# Patient Record
Sex: FEMALE | Race: BLACK OR AFRICAN AMERICAN | Employment: PART TIME | URBAN - METROPOLITAN AREA
[De-identification: names, ages, dates, MRNs, and addresses within clinical notes are randomized per-mention and may not be internally consistent; named-entity substitution may affect disease eponyms.]

---

## 2017-09-25 ENCOUNTER — HOSPITAL ENCOUNTER (EMERGENCY)
Age: 22
Discharge: HOME | End: 2017-09-25
Attending: EMERGENCY MEDICINE
Payer: COMMERCIAL

## 2017-09-25 ENCOUNTER — APPOINTMENT (OUTPATIENT)
Dept: ULTRASOUND IMAGING | Age: 22
End: 2017-09-25
Attending: PHYSICIAN ASSISTANT
Payer: COMMERCIAL

## 2017-09-25 VITALS
HEART RATE: 102 BPM | DIASTOLIC BLOOD PRESSURE: 71 MMHG | RESPIRATION RATE: 18 BRPM | SYSTOLIC BLOOD PRESSURE: 117 MMHG | TEMPERATURE: 98.7 F | OXYGEN SATURATION: 97 % | WEIGHT: 186.73 LBS

## 2017-09-25 DIAGNOSIS — R59.1 LYMPHADENOPATHY: Primary | ICD-10-CM

## 2017-09-25 LAB
GLUCOSE BLD STRIP.AUTO-MCNC: 129 MG/DL (ref 65–100)
SERVICE CMNT-IMP: ABNORMAL

## 2017-09-25 PROCEDURE — 99283 EMERGENCY DEPT VISIT LOW MDM: CPT

## 2017-09-25 PROCEDURE — 76536 US EXAM OF HEAD AND NECK: CPT

## 2017-09-25 PROCEDURE — 82962 GLUCOSE BLOOD TEST: CPT

## 2017-09-25 RX ORDER — SULFAMETHOXAZOLE AND TRIMETHOPRIM 800; 160 MG/1; MG/1
1 TABLET ORAL 2 TIMES DAILY
COMMUNITY
End: 2017-11-20 | Stop reason: CLARIF

## 2017-09-25 NOTE — LETTER
[address] 
[address] [facility] DEPT 
[address] [place] [address] [address] [address] 
[phone] Work/School Note Date: 9/25/2017 To Whom It May concern: 
 
Nisa Cruz was in ED [facility] 9/25/2017. She can return to work on Tuesday September 26, 2017. Sincerely, Rosa Maria PINEDA [place], [place]

## 2017-09-26 NOTE — ED NOTES
Discharge instructions given to patient and mom by Rosa Maria Marti, [address].  EDUCATED patient to follow up with the PCP if the knots get worse.

## 2017-09-26 NOTE — DISCHARGE INSTRUCTIONS
We hope that we have addressed all of your medical concerns. The examination and treatment you received in the Emergency Department were for an emergent problem and were not intended as complete care. It is important that you follow up with your healthcare provider(s) for ongoing care. If your symptoms worsen or do not improve as expected, and you are unable to reach your usual health care provider(s), you should return to the Emergency Department. Today's healthcare is undergoing tremendous change, and patient satisfaction surveys are one of the many tools to assess the quality of medical care. You may receive a survey from the [employer] regarding your experience in the Emergency Department. I hope that your experience has been completely positive, particularly the medical care that I provided. As such, please participate in the survey; anything less than excellent does not meet my expectations or intentions. Thank you for allowing us to provide you with medical care today. We realize that you have many choices for your emergency care needs. Please choose us in the future for any continued health care needs. Regards,           [place]. [place] [address].   Office: [phone]            Recent Results (from the past 24 hour(s))   GLUCOSE, POC    Collection Time: 09/25/17  9:46 PM   Result Value Ref Range    Glucose (POC) 129 (H) 65 - 100 mg/dL    Performed by Fred Burgos         Head Neck Soft Tissue    Result Date: 9/25/2017  INDICATION: Palpable knot on back of left neck EXAM: Ultrasound Neck Soft Tissue. FINDINGS: Soft tissue sonography of the posterior left neck area of clinical interest shows no cyst, abscess, mass or adenopathy. Incidentally noted is a benign appearing left submandibular lymph node measuring 1.2 x 1.0 x 0.7 cm without hypervascularity or suppuration. IMPRESSION: No fluid collection.          Swollen Lymph Nodes: Care Instructions  Your Care Instructions  Lymph nodes are small, bean-shaped glands throughout the body. They help your body fight germs and infections. Lymph nodes often swell when there is a problem such as an injury, infection, or tumor. · The nodes in your neck, under your chin, or behind your ears may swell when you have a cold or sore throat. · An injury or infection in a leg or foot can make the nodes in your groin swell. · Sometimes medicine can make lymph nodes swell, but this is rare. Treatment depends on what caused your nodes to swell. Usually the nodes return to normal size without a problem. Follow-up care is a key part of your treatment and safety. Be sure to make and go to all appointments, and call your doctor if you are having problems. Its also a good idea to know your test results and keep a list of the medicines you take. How can you care for yourself at home? · Take your medicines exactly as prescribed. Call your doctor if you think you are having a problem with your medicine. · Avoid irritation. ¨ Do not squeeze or pick at the lump. ¨ Do not stick a needle in it. · Prevent infection. Do not squeeze, drain, or puncture a painful lump. Doing this can irritate or inflame the lump, push any existing infection deeper into the skin, or cause severe bleeding. · Get extra rest. Slow down just a little from your usual routine. · Drink plenty of fluids, enough so that your urine is light yellow or clear like water. If you have kidney, heart, or liver disease and have to limit fluids, talk with your doctor before you increase the amount of fluids you drink. · Take an over-the-counter pain medicine, such as acetaminophen (Tylenol), ibuprofen (Advil, Motrin), or naproxen (Aleve). Read and follow all instructions on the label. · Do not take two or more pain medicines at the same time unless the doctor told you to. Many pain medicines have acetaminophen, which is Tylenol.  Too much acetaminophen (Tylenol) can be harmful. When should you call for help? Watch closely for changes in your health, and be sure to contact your doctor if:  · Your lymph nodes do not get smaller, or they get bigger. · The area becomes red and feels tender. · The nodes feel hard and do not move when you push on them. · You have a fever of 100° F.  · You have night sweats. · You lose weight without trying. Where can you learn more? Go to http://kai-benjy.info/. Enter J910 in the search box to learn more about \"Swollen Lymph Nodes: Care Instructions. \"  Current as of: March 3, 2017  Content Version: 11.3  © 6775-4155 [employer]. Care instructions adapted under license by [employer] (which disclaims liability or warranty for this information). If you have questions about a medical condition or this instruction, always ask your healthcare professional. [address] any warranty or liability for your use of this information.

## 2017-09-26 NOTE — ED PROVIDER NOTES
HPI Comments: This is a 22 yo AAF with medical hx remarkable for insulin dependent DM presenting ambulatory to the ED with complaint of a \"knot\" on the back of the neck noticed about one week ago. Area is not painful, erythematous or warm to touch. Yesterday noticed a palpable auricular lymph node. Seen at [facility] last week and prescribed Bactrim. The lesion has not changed since starting abx. Blood sugar has been well controlled. Denies recent travel or ill contacts. No fever, headache, sore throat, cough, rhinorrhea, sneezing, SOB, abdominal pain, nausea, vomiting, or urinary complaints. Patient is a 23 y.o. female presenting with other event. The history is provided by the patient. Other   Pertinent negatives include no chest pain, no abdominal pain, no headaches and no shortness of breath. Past Medical History:   Diagnosis Date    Diabetes ([address] type 1       History reviewed. No pertinent surgical history. History reviewed. No pertinent family history. Social History     Social History    Marital status: SINGLE     Spouse name: N/A    Number of children: N/A    Years of education: N/A     Occupational History    Not on file. Social History Main Topics    Smoking status: Not on file    Smokeless tobacco: Not on file    Alcohol use No    Drug use: Not on file    Sexual activity: Not on file     Other Topics Concern    Not on file     Social History Narrative         ALLERGIES: Review of patient's allergies indicates no known allergies. Review of Systems   Constitutional: Negative. Negative for chills, fatigue and fever. HENT: Negative. Negative for congestion, ear pain, facial swelling, rhinorrhea, sneezing and sore throat. Eyes: Negative for pain, discharge and itching. Respiratory: Negative for cough, chest tightness and shortness of breath. Cardiovascular: Negative. Negative for chest pain and leg swelling. Gastrointestinal: Negative.   Negative for abdominal distention, abdominal pain, constipation, diarrhea, nausea and vomiting. Genitourinary: Negative for difficulty urinating, frequency and urgency. Musculoskeletal: Negative for arthralgias, back pain, joint swelling, neck pain (\"knot\" on back of neck) and neck stiffness. Skin: Negative for color change and rash. Neurological: Negative for dizziness, numbness and headaches. Psychiatric/Behavioral: Negative for confusion and decreased concentration. All other systems reviewed and are negative. Vitals:    09/25/17 2041   BP: 117/71   Pulse: (!) 102   Resp: 18   Temp: 98.7 °F (37.1 °C)   SpO2: 97%   Weight: 84.7 kg (186 lb 11.7 oz)            Physical Exam   Constitutional: She is oriented to person, place, and time. She appears well-developed and well-nourished. No distress. Mildly obese AAF seated in NAD   HENT:   Head: Normocephalic and atraumatic. Right Ear: External ear normal.   Left Ear: External ear normal.   Nose: Nose normal.   Mouth/Throat: Oropharynx is clear and moist. No oropharyngeal exudate. Eyes: Conjunctivae and EOM are normal. Pupils are equal, round, and reactive to light. Right eye exhibits no discharge. Left eye exhibits no discharge. No scleral icterus. Neck: Normal range of motion. Neck supple. Cardiovascular: Normal rate and regular rhythm. Exam reveals no gallop and no friction rub. No murmur heard. Pulmonary/Chest: Effort normal and breath sounds normal. She has no wheezes. She has no rales. Abdominal: Soft. Bowel sounds are normal. She exhibits no distension. There is no tenderness. There is no rebound and no guarding. Lymphadenopathy:        Head (right side): No submental, no preauricular, no posterior auricular and no occipital adenopathy present. Head (left side): Submandibular adenopathy present. No tonsillar, no preauricular, no posterior auricular and no occipital adenopathy present.    Neurological: She is alert and oriented to person, place, and time. No cranial nerve deficit. Coordination normal.   Skin: Skin is warm and dry. She is not diaphoretic. Psychiatric: She has a normal mood and affect. Her behavior is normal.   Nursing note and vitals reviewed. MDM  Number of Diagnoses or Management Options  Diagnosis management comments: 22 yo AAF with cystic lesion to the posterior neck with associated mildly inflamed lymph node. No associated systemic symptoms. Reported good BS control. Afebrile with stable vitals. Plan  US soft tissue  Reassess. Rosa Maria PINEDA Tereso, [address]         Amount and/or Complexity of Data Reviewed  Clinical lab tests: ordered and reviewed  Tests in the radiology section of CPT®: reviewed and ordered  Independent visualization of images, tracings, or specimens: yes      ED Course       Procedures    Progress note    Imaging reviewed. + small lymph node. Will refer to ENT with continued abx. April EDWIN Rider, [address]  Patient's results have been reviewed with them. Patient and/or family have verbally conveyed their understanding and agreement of the patient's signs, symptoms, diagnosis, treatment and prognosis and additionally agree to follow up as recommended or return to the Emergency Room should their condition change prior to follow-up. Discharge instructions have also been provided to the patient with some educational information regarding their diagnosis as well a list of reasons why they would want to return to the ER prior to their follow-up appointment should their condition change. April EDWIN Rider, [address]       Patient's results have been reviewed with them. Patient and/or family have verbally conveyed their understanding and agreement of the patient's signs, symptoms, diagnosis, treatment and prognosis and additionally agree to follow up as recommended or return to the Emergency Room should their condition change prior to follow-up.   Discharge instructions have also been provided to the patient with some educational information regarding their diagnosis as well a list of reasons why they would want to return to the ER prior to their follow-up appointment should their condition change. Elvira Elizondo    A/P  Lymphadenopathy: Follow-up with ear nose and throat doctor. Continue antibiotic. Return for any new or worsening.  Rosa Maria [facility]

## 2017-09-26 NOTE — ED NOTES
Bedside shift change report given to So RN by Karen Pino RN. Report included the following information SBAR.

## 2017-09-26 NOTE — ED TRIAGE NOTES
Pt with \"knot\" on left side of neck that she first noticed yesterday. Pt reports that she was started on antibiotics for a \"knot\" on the back of her neck about a week ago. No fever, no pain.

## 2017-11-20 ENCOUNTER — HOSPITAL ENCOUNTER (EMERGENCY)
Age: 22
Discharge: HOME | End: 2017-11-20
Attending: PEDIATRICS
Payer: COMMERCIAL

## 2017-11-20 VITALS
TEMPERATURE: 98.1 F | OXYGEN SATURATION: 99 % | HEART RATE: 80 BPM | RESPIRATION RATE: 20 BRPM | DIASTOLIC BLOOD PRESSURE: 80 MMHG | WEIGHT: 186.29 LBS | SYSTOLIC BLOOD PRESSURE: 118 MMHG

## 2017-11-20 DIAGNOSIS — Z86.39 HISTORY OF DIABETES MELLITUS: ICD-10-CM

## 2017-11-20 DIAGNOSIS — R11.10 VOMITING, INTRACTABILITY OF VOMITING NOT SPECIFIED, PRESENCE OF NAUSEA NOT SPECIFIED, UNSPECIFIED VOMITING TYPE: Primary | ICD-10-CM

## 2017-11-20 DIAGNOSIS — R10.84 ABDOMINAL PAIN, GENERALIZED: ICD-10-CM

## 2017-11-20 LAB
ALBUMIN SERPL-MCNC: 3.4 G/DL (ref 3.5–5)
ALBUMIN/GLOB SERPL: 0.9 {RATIO} (ref 1.1–2.2)
ALP SERPL-CCNC: 120 U/L (ref 45–117)
ALT SERPL-CCNC: 37 U/L (ref 12–78)
ANION GAP SERPL CALC-SCNC: 4 MMOL/L (ref 5–15)
APPEARANCE UR: CLEAR
ARTERIAL PATENCY WRIST A: NO
AST SERPL-CCNC: 20 U/L (ref 15–37)
BACTERIA URNS QL MICRO: NEGATIVE /HPF
BASE EXCESS BLDV CALC-SCNC: 8 MMOL/L
BASOPHILS # BLD: 0 K/UL (ref 0–0.1)
BASOPHILS NFR BLD: 0 % (ref 0–1)
BDY SITE: ABNORMAL
BILIRUB SERPL-MCNC: 0.5 MG/DL (ref 0.2–1)
BILIRUB UR QL: NEGATIVE
BUN SERPL-MCNC: 11 MG/DL (ref 6–20)
BUN/CREAT SERPL: 12 (ref 12–20)
CALCIUM SERPL-MCNC: 9.3 MG/DL (ref 8.5–10.1)
CHLORIDE SERPL-SCNC: 102 MMOL/L (ref 97–108)
CO2 SERPL-SCNC: 31 MMOL/L (ref 21–32)
COLOR UR: ABNORMAL
CREAT SERPL-MCNC: 0.9 MG/DL (ref 0.55–1.02)
EOSINOPHIL # BLD: 0.2 K/UL (ref 0–0.4)
EOSINOPHIL NFR BLD: 3 % (ref 0–7)
EPITH CASTS URNS QL MICRO: ABNORMAL /LPF
ERYTHROCYTE [DISTWIDTH] IN BLOOD BY AUTOMATED COUNT: 12.8 % (ref 11.5–14.5)
EST. AVERAGE GLUCOSE BLD GHB EST-MCNC: 203 MG/DL
GAS FLOW.O2 O2 DELIVERY SYS: ABNORMAL L/MIN
GLOBULIN SER CALC-MCNC: 3.8 G/DL (ref 2–4)
GLUCOSE BLD STRIP.AUTO-MCNC: 203 MG/DL (ref 65–100)
GLUCOSE BLD STRIP.AUTO-MCNC: 250 MG/DL (ref 65–100)
GLUCOSE BLD STRIP.AUTO-MCNC: 253 MG/DL (ref 65–100)
GLUCOSE SERPL-MCNC: 269 MG/DL (ref 65–100)
GLUCOSE UR STRIP.AUTO-MCNC: >1000 MG/DL
HBA1C MFR BLD: 8.7 % (ref 4.2–6.3)
HCG UR QL: NEGATIVE
HCO3 BLDV-SCNC: 32.8 MMOL/L (ref 23–28)
HCT VFR BLD AUTO: 37.3 % (ref 35–47)
HGB BLD-MCNC: 12.6 G/DL (ref 11.5–16)
HGB UR QL STRIP: NEGATIVE
HYALINE CASTS URNS QL MICRO: ABNORMAL /LPF (ref 0–5)
KETONES UR QL STRIP.AUTO: ABNORMAL MG/DL
LEUKOCYTE ESTERASE UR QL STRIP.AUTO: NEGATIVE
LYMPHOCYTES # BLD: 1.7 K/UL (ref 0.8–3.5)
LYMPHOCYTES NFR BLD: 24 % (ref 12–49)
MAGNESIUM SERPL-MCNC: 1.8 MG/DL (ref 1.6–2.4)
MCH RBC QN AUTO: 30.1 PG (ref 26–34)
MCHC RBC AUTO-ENTMCNC: 33.8 G/DL (ref 30–36.5)
MCV RBC AUTO: 89 FL (ref 80–99)
MONOCYTES # BLD: 0.7 K/UL (ref 0–1)
MONOCYTES NFR BLD: 9 % (ref 5–13)
NEUTS SEG # BLD: 4.6 K/UL (ref 1.8–8)
NEUTS SEG NFR BLD: 64 % (ref 32–75)
NITRITE UR QL STRIP.AUTO: NEGATIVE
PCO2 BLDV: 55.3 MMHG (ref 41–51)
PH BLDV: 7.38 [PH] (ref 7.32–7.42)
PH UR STRIP: 6.5 [PH] (ref 5–8)
PHOSPHATE SERPL-MCNC: 1.7 MG/DL (ref 2.6–4.7)
PLATELET # BLD AUTO: 236 K/UL (ref 150–400)
PO2 BLDV: 23 MMHG (ref 25–40)
POTASSIUM SERPL-SCNC: 3.9 MMOL/L (ref 3.5–5.1)
PROT SERPL-MCNC: 7.2 G/DL (ref 6.4–8.2)
PROT UR STRIP-MCNC: NEGATIVE MG/DL
RBC # BLD AUTO: 4.19 M/UL (ref 3.8–5.2)
RBC #/AREA URNS HPF: ABNORMAL /HPF (ref 0–5)
SAO2 % BLDV: 38 % (ref 65–88)
SERVICE CMNT-IMP: ABNORMAL
SODIUM SERPL-SCNC: 137 MMOL/L (ref 136–145)
SP GR UR REFRACTOMETRY: 1.03 (ref 1–1.03)
SPECIMEN TYPE: ABNORMAL
UR CULT HOLD, URHOLD: NORMAL
UROBILINOGEN UR QL STRIP.AUTO: 1 EU/DL (ref 0.2–1)
WBC # BLD AUTO: 7.3 K/UL (ref 3.6–11)
WBC URNS QL MICRO: ABNORMAL /HPF (ref 0–4)

## 2017-11-20 PROCEDURE — 83735 ASSAY OF MAGNESIUM: CPT | Performed by: PEDIATRICS

## 2017-11-20 PROCEDURE — 82962 GLUCOSE BLOOD TEST: CPT

## 2017-11-20 PROCEDURE — 80053 COMPREHEN METABOLIC PANEL: CPT | Performed by: PEDIATRICS

## 2017-11-20 PROCEDURE — 36415 COLL VENOUS BLD VENIPUNCTURE: CPT | Performed by: PEDIATRICS

## 2017-11-20 PROCEDURE — 81001 URINALYSIS AUTO W/SCOPE: CPT | Performed by: PEDIATRICS

## 2017-11-20 PROCEDURE — 74011250637 HC RX REV CODE- 250/637: Performed by: PEDIATRICS

## 2017-11-20 PROCEDURE — 83036 HEMOGLOBIN GLYCOSYLATED A1C: CPT | Performed by: PEDIATRICS

## 2017-11-20 PROCEDURE — 85025 COMPLETE CBC W/AUTO DIFF WBC: CPT | Performed by: PEDIATRICS

## 2017-11-20 PROCEDURE — 74011250636 HC RX REV CODE- 250/636: Performed by: PEDIATRICS

## 2017-11-20 PROCEDURE — 99284 EMERGENCY DEPT VISIT MOD MDM: CPT

## 2017-11-20 PROCEDURE — 81025 URINE PREGNANCY TEST: CPT

## 2017-11-20 PROCEDURE — 96361 HYDRATE IV INFUSION ADD-ON: CPT

## 2017-11-20 PROCEDURE — 82803 BLOOD GASES ANY COMBINATION: CPT

## 2017-11-20 PROCEDURE — 96374 THER/PROPH/DIAG INJ IV PUSH: CPT

## 2017-11-20 PROCEDURE — 84100 ASSAY OF PHOSPHORUS: CPT | Performed by: PEDIATRICS

## 2017-11-20 RX ORDER — ONDANSETRON 4 MG/1
4 TABLET, ORALLY DISINTEGRATING ORAL
Status: COMPLETED | OUTPATIENT
Start: 2017-11-20 | End: 2017-11-20

## 2017-11-20 RX ORDER — ONDANSETRON 8 MG/1
8 TABLET, ORALLY DISINTEGRATING ORAL
Qty: 6 TAB | Refills: 0 | Status: SHIPPED | OUTPATIENT
Start: 2017-11-20

## 2017-11-20 RX ORDER — CHOLECALCIFEROL (VITAMIN D3) 125 MCG
CAPSULE ORAL
COMMUNITY

## 2017-11-20 RX ORDER — ONDANSETRON 2 MG/ML
4 INJECTION INTRAMUSCULAR; INTRAVENOUS
Status: COMPLETED | OUTPATIENT
Start: 2017-11-20 | End: 2017-11-20

## 2017-11-20 RX ADMIN — SODIUM CHLORIDE 1000 ML: 900 INJECTION, SOLUTION INTRAVENOUS at 11:26

## 2017-11-20 RX ADMIN — ONDANSETRON HYDROCHLORIDE 4 MG: 2 INJECTION, SOLUTION INTRAVENOUS at 10:00

## 2017-11-20 RX ADMIN — SODIUM CHLORIDE 1000 ML: 900 INJECTION, SOLUTION INTRAVENOUS at 10:00

## 2017-11-20 RX ADMIN — ONDANSETRON 4 MG: 4 TABLET, ORALLY DISINTEGRATING ORAL at 09:27

## 2017-11-20 NOTE — ED NOTES
Education:  Pt educated on prescribed medications. Pt  verbalized understanding of prescribed medications.

## 2017-11-20 NOTE — LETTER
[address] 
[address] [facility] DEPT 
[address] [address] 
[phone] Work/School Note Date: 11/20/2017 To Whom It May concern: 
 
Kamari Gallegos was seen and treated today in the emergency room by the following provider(s): 
Attending Provider: Sherlene Olszewski, MD. Stephanie Rene's mother brought her daughter to the Emergency Department for evaluation today.  
 
Sincerely, 
 
 
 
 
Sherlene Olszewski, MD 
 
 
 
 1 [address]

## 2017-11-20 NOTE — LETTER
[address] 
[address] [facility] DEPT 
[address] [place], [place] [address] [address] 
[phone] Work/School Note Date: 11/20/2017 To Whom It May concern: 
 
Araseli Bocanegra was seen and treated today in the emergency room by the following provider(s): 
Attending Provider: Gary Kim MD. Stephanie RUSSELL Rene may return to work on 11/21/2017.  
 
Sincerely, 
 
 
 
 
Gary Kim MD

## 2017-11-20 NOTE — DISCHARGE INSTRUCTIONS
Return to the Emergency Department for any worsening symptoms, any trouble breathing, fevers, persistent vomiting, high blood sugars, if abdominal pain persists or worsens or other new concerns. Abdominal Pain: Care Instructions  Your Care Instructions    Abdominal pain has many possible causes. Some aren't serious and get better on their own in a few days. Others need more testing and treatment. If your pain continues or gets worse, you need to be rechecked and may need more tests to find out what is wrong. You may need surgery to correct the problem. Don't ignore new symptoms, such as fever, nausea and vomiting, urination problems, pain that gets worse, and dizziness. These may be signs of a more serious problem. Your doctor may have recommended a follow-up visit in the next 8 to 12 hours. If you are not getting better, you may need more tests or treatment. The doctor has checked you carefully, but problems can develop later. If you notice any problems or new symptoms, get medical treatment right away. Follow-up care is a key part of your treatment and safety. Be sure to make and go to all appointments, and call your doctor if you are having problems. It's also a good idea to know your test results and keep a list of the medicines you take. How can you care for yourself at home? · Rest until you feel better. · To prevent dehydration, drink plenty of fluids, enough so that your urine is light yellow or clear like water. Choose water and other caffeine-free clear liquids until you feel better. If you have kidney, heart, or liver disease and have to limit fluids, talk with your doctor before you increase the amount of fluids you drink. · If your stomach is upset, eat mild foods, such as rice, dry toast or crackers, bananas, and applesauce. Try eating several small meals instead of two or three large ones.   · Wait until 48 hours after all symptoms have gone away before you have spicy foods, alcohol, and drinks that contain caffeine. · Do not eat foods that are high in fat. · Avoid anti-inflammatory medicines such as aspirin, ibuprofen (Advil, Motrin), and naproxen (Aleve). These can cause stomach upset. Talk to your doctor if you take daily aspirin for another health problem. When should you call for help? Call 911 anytime you think you may need emergency care. For example, call if:  ? · You passed out (lost consciousness). ? · You pass maroon or very bloody stools. ? · You vomit blood or what looks like coffee grounds. ? · You have new, severe belly pain. ?Call your doctor now or seek immediate medical care if:  ? · Your pain gets worse, especially if it becomes focused in one area of your belly. ? · You have a new or higher fever. ? · Your stools are black and look like tar, or they have streaks of blood. ? · You have unexpected vaginal bleeding. ? · You have symptoms of a urinary tract infection. These may include:  ¨ Pain when you urinate. ¨ Urinating more often than usual.  ¨ Blood in your urine. ? · You are dizzy or lightheaded, or you feel like you may faint. ? Watch closely for changes in your health, and be sure to contact your doctor if:  ? · You are not getting better after 1 day (24 hours). Where can you learn more? Go to http://kai-benjy.info/. Enter B719 in the search box to learn more about \"Abdominal Pain: Care Instructions. \"  Current as of: March 20, 2017  Content Version: 11.4  © 4249-0046 [employer]. Care instructions adapted under license by [employer] (which disclaims liability or warranty for this information). If you have questions about a medical condition or this instruction, always ask your healthcare professional. [address] any warranty or liability for your use of this information.          Nausea and Vomiting: Care Instructions  Your Care Instructions    When you are nauseated, you may feel weak and sweaty and notice a lot of saliva in your mouth. Nausea often leads to vomiting. Most of the time you do not need to worry about nausea and vomiting, but they can be signs of other illnesses. Two common causes of nausea and vomiting are stomach flu and food poisoning. Nausea and vomiting from viral stomach flu will usually start to improve within 24 hours. Nausea and vomiting from food poisoning may last from 12 to 48 hours. The doctor has checked you carefully, but problems can develop later. If you notice any problems or new symptoms, get medical treatment right away. Follow-up care is a key part of your treatment and safety. Be sure to make and go to all appointments, and call your doctor if you are having problems. It's also a good idea to know your test results and keep a list of the medicines you take. How can you care for yourself at home? · To prevent dehydration, drink plenty of fluids, enough so that your urine is light yellow or clear like water. Choose water and other caffeine-free clear liquids until you feel better. If you have kidney, heart, or liver disease and have to limit fluids, talk with your doctor before you increase the amount of fluids you drink. · Rest in bed until you feel better. · When you are able to eat, try clear soups, mild foods, and liquids until all symptoms are gone for 12 to 48 hours. Other good choices include dry toast, crackers, cooked cereal, and gelatin dessert, such as Jell-O. When should you call for help? Call 911 anytime you think you may need emergency care. For example, call if:  ? · You passed out (lost consciousness). ?Call your doctor now or seek immediate medical care if:  ? · You have symptoms of dehydration, such as:  ¨ Dry eyes and a dry mouth. ¨ Passing only a little dark urine. ¨ Feeling thirstier than usual.   ? · You have new or worsening belly pain. ? · You have a new or higher fever.    ? · You vomit blood or what looks like coffee grounds. ? Watch closely for changes in your health, and be sure to contact your doctor if:  ? · You have ongoing nausea and vomiting. ? · Your vomiting is getting worse. ? · Your vomiting lasts longer than 2 days. ? · You are not getting better as expected. Where can you learn more? Go to http://kai-benjy.info/. Enter 25 [id number] in the search box to learn more about \"Nausea and Vomiting: Care Instructions. \"  Current as of: March 20, 2017  Content Version: 11.4  © 4386-0169 [employer]. Care instructions adapted under license by [employer] (which disclaims liability or warranty for this information). If you have questions about a medical condition or this instruction, always ask your healthcare professional. [address] any warranty or liability for your use of this information. Oral Rehydration: Care Instructions  Your Care Instructions    Dehydration occurs when your body loses too much water. This can happen if you do not drink enough fluids or lose a lot of fluid due to diarrhea, vomiting, or sweating. Being dehydrated can cause health problems and can even be life-threatening. To replace lost fluids, you need to drink liquid that contains special chemicals called electrolytes. Electrolytes keep your body working well. Plain water does not have electrolytes. You also need to rest to prevent more fluid loss. Replacing water and electrolytes (oral rehydration) completely takes about 36 hours. But you should feel better within a few hours. Follow-up care is a key part of your treatment and safety. Be sure to make and go to all appointments, and call your doctor if you are having problems. It's also a good idea to know your test results and keep a list of the medicines you take. How can you care for yourself at home?   · Take frequent sips of a drink such as Gatorade, Powerade, or other rehydration drinks that your doctor suggests. These replace both fluid and important chemicals (electrolytes) you need for balance in your blood. · Drink 2 quarts of cool liquid over 2 to 4 hours. You should have at least 10 glasses of liquid a day to replace lost fluid. If you have kidney, heart, or liver disease and have to limit fluids, talk with your doctor before you increase the amount of fluids you drink. · Make your own drink. Measure everything carefully. The drink may not work well or may even be harmful if the amounts are off. ¨ 1 quart water  ¨ ½ teaspoon salt  ¨ 6 teaspoons sugar  · Do not drink liquid with caffeine, such as coffee and jaspal. · Do not drink any alcohol. It can make you dehydrated. · Drink plenty of fluids, enough so that your urine is light yellow or clear like water. If you have kidney, heart, or liver disease and have to limit fluids, talk with your doctor before you increase the amount of fluids you drink. When should you call for help? Call 911 anytime you think you may need emergency care. For example, call if:  ? · You have signs of severe dehydration, such as:  ¨ You are confused or unable to stay awake. ¨ You passed out (lost consciousness). ?Call your doctor now or seek immediate medical care if:  ? · You still have signs of dehydration. You have sunken eyes and a dry mouth, and you pass only a little dark urine. ? · You are dizzy or lightheaded, or you feel like you may faint. ? · You are not able to keep down fluids. ? Watch closely for changes in your health, and be sure to contact your doctor if:  ? · You do not get better as expected. Where can you learn more? Go to http://kai-benjy.info/. Enter I040 in the search box to learn more about \"Oral Rehydration: Care Instructions. \"  Current as of: March 20, 2017  Content Version: 11.4  © 4916-4470 [employer].  Care instructions adapted under license by [employer] (which disclaims liability or warranty for this information). If you have questions about a medical condition or this instruction, always ask your healthcare professional. [place] [address] any warranty or liability for your use of this information.

## 2017-11-20 NOTE — ED NOTES
Bedside shift change report given to Marshall Espinoza RN by Cruz Caceres RN. Report included the following information SBAR.

## 2021-10-15 NOTE — ED PROVIDER NOTES
HPI Comments: History of present illness:    Patient is a 82-year-old female with history of type 1 diabetes since age 7 he now presents with persistent vomiting and lower abdominal pain. Patient states she was in her usual state of health yesterday. She states her glucoses normally run in the 100s. Beginning today she has had vomiting x7-8 nonbloody nonbilious. She states her sugar has was running for 350 and gave herself extra insulin. No fevers no headache no sore throat no cough no chest pain no trouble breathing. She states abdominal pain is described as crampy with nausea coming in waves. No diarrhea no dysuria no hematuria. Last menstrual period was November 8 and normal per patient. She denies vaginal discharges. Mother states child received 45 units of NovoLog this morning for correction of hypoglycemia and carb correction. Patient states she normally takes NovoLog with each meal including bedtime snack. Has an insulin pump. She does not take Lantus. No other complaints no modifying factors no other medications  Diabetes managed by Dr. Kiana Montes De Oca at [facility]    Review of systems: A 10 point review is conducted. All pertinent positives and negatives are as stated in history of present illness  Allergies: None  Medications: NovoLog  Immunizations: Up to date  Past medical history: Positive for type 1 diabetes  Family history: Noncontributory to this illness  Social history: Lives with family. Denies smoking or drug use    Patient is a 23 y.o. female presenting with vomiting. Vomiting    Associated symptoms include abdominal pain, headaches and headaches. Pertinent negatives include no fever and no diarrhea. Past Medical History:   Diagnosis Date    Diabetes ([address] type 1       History reviewed. No pertinent surgical history. History reviewed. No pertinent family history.     Social History     Social History    Marital status: SINGLE     Spouse name: N/A    Number of children: N/A    Years of education: N/A     Occupational History    Not on file. Social History Main Topics    Smoking status: Not on file    Smokeless tobacco: Not on file    Alcohol use No    Drug use: Not on file    Sexual activity: Not on file     Other Topics Concern    Not on file     Social History Narrative         ALLERGIES: Review of patient's allergies indicates no known allergies. Review of Systems   Constitutional: Negative for activity change, appetite change and fever. HENT: Negative for ear pain, sore throat and trouble swallowing. Eyes: Negative for photophobia, discharge, redness and visual disturbance. Gastrointestinal: Positive for abdominal pain, nausea and vomiting. Negative for diarrhea. Genitourinary: Positive for pelvic pain. Negative for decreased urine volume, difficulty urinating, dysuria, hematuria, menstrual problem, vaginal bleeding and vaginal discharge. Musculoskeletal: Negative for gait problem and neck pain. Skin: Negative for rash. Neurological: Positive for headaches. Negative for dizziness and weakness. All other systems reviewed and are negative. Vitals:    11/20/17 0907 11/20/17 1120 11/20/17 1325   BP: 105/73 109/72 118/80   Pulse: 81 72 80   Resp: 20 18 20   Temp: 98 °F (36.7 °C)  98.1 °F (36.7 °C)   SpO2: 98% 99% 99%   Weight: 84.5 kg (186 lb 4.6 oz)              Physical Exam   Nursing note and vitals reviewed. PE:  GEN:  WDWN female alert non toxic in NAD talkative interactive well appearing  SK: CRT < 2 sec, good distal pulses. No lesions, no rashes, moist mm  HEENT: H: AT/NC. E: EOMI , PERRL, E: TM clear  N/T: Clear oropharynx  NECK: supple, no meningismus. No pain on palpation  Chest: Clear to auscultation, clear BS. NO rales, rhonchi, wheezes or distress. No   Retraction. Chest Wall: no tenderness on palpation  CV: Regular rate and rhythm. Normal S1 S2 .  No murmur, gallops or thrills  ABD: Soft + subjective tenderness in R + LLQ, no hepatomegaly, good bowel sound, no guarding,   MS: FROM all extremities, no long bone tenderness. No swelling, cyanosis, no edema. Good distal pulses. Gait normal  NEURO: Alert. No focality. Cranial nerves 2-12 grossly intact. GCS 15  Behavior and mentation appropriate for age        MDM  Number of Diagnoses or Management Options  Abdominal pain, generalized:   History of diabetes mellitus:   Vomiting, intractability of vomiting not specified, presence of nausea not specified, unspecified vomiting type:   Diagnosis management comments: Medical decision making:    Differential diagnosis includes: DKA hyperglycemia gastroenteritis pancreatitis UTI appendicitis ovarian torsion or cyst    Physical exam is reassuring for non-serious illness at this time. Abdomen is soft positive subjective tenderness but no guarding no psoas no rebound  Plan of care glucose on arrival 253  Urinalysis: Trace ketones greater than 1000 glucose  Hemoglobin: 8.7   Magnesium 1.8 phosphorus 1.7  ENT: Unremarkable with exception of glucose of 269, anion gap 4 bicarbonate 31  Venous blood gas: PH 7.38 pCO2 55 base deficit -8  Urine hCG: Negative    Patient received Zofran IV +1 L normal saline bolus. On reexamination patient states she feels markedly better but still with complaints of lower pelvic pain. Offered ultrasound for further evaluation also abdomen is very soft no guarding or rebound. Family declined at this time    Patient given oral challenge of the peanut crackers plus diet ginger ale and additional 1 L normal saline to clear ketones. Patient with no further vomiting in ER after Zofran. She has tolerated oral challenge well and gave herself 12 units of insulin to cover the carb count of her snack. She states she feels markedly better  Repeat exam of the abdomen is soft with no guarding no rebound no tenderness at this time. Okay for discharge home    Precautions reviewed with patient and mother.  They are understanding and agreed with the plan and will return to the ER for any worsening symptoms including any trouble breathing fevers vomiting , return of pain or other new concerns. She understands the signs and symptoms of appendicitis may be subtle initially and they worsen over the next one to 2 days.  They will return for any signs of return of abdominal pain    Home on Zofran    They will follow up with PCP in one day as needed    Clinical impression:  Dehydration  Vomiting  Abdominal pain  Diabetes type 1       Amount and/or Complexity of Data Reviewed  Clinical lab tests: ordered and reviewed      ED Course       Procedures Yes